# Patient Record
Sex: FEMALE | URBAN - METROPOLITAN AREA
[De-identification: names, ages, dates, MRNs, and addresses within clinical notes are randomized per-mention and may not be internally consistent; named-entity substitution may affect disease eponyms.]

---

## 2023-08-19 ENCOUNTER — TELEPHONE (OUTPATIENT)
Dept: OTHER | Facility: OTHER | Age: 88
End: 2023-08-19

## 2023-08-21 ENCOUNTER — NURSING HOME VISIT (OUTPATIENT)
Dept: GERIATRICS | Facility: OTHER | Age: 88
End: 2023-08-21
Payer: MEDICARE

## 2023-08-21 DIAGNOSIS — Z51.5 HOSPICE CARE PATIENT: ICD-10-CM

## 2023-08-21 DIAGNOSIS — E78.5 HYPERLIPIDEMIA, UNSPECIFIED HYPERLIPIDEMIA TYPE: ICD-10-CM

## 2023-08-21 DIAGNOSIS — F01.50 MIXED ALZHEIMER'S AND VASCULAR DEMENTIA (HCC): ICD-10-CM

## 2023-08-21 DIAGNOSIS — Z86.73 HISTORY OF CVA (CEREBROVASCULAR ACCIDENT): ICD-10-CM

## 2023-08-21 DIAGNOSIS — M17.0 OSTEOARTHRITIS OF BOTH KNEES, UNSPECIFIED OSTEOARTHRITIS TYPE: ICD-10-CM

## 2023-08-21 DIAGNOSIS — E11.59 HYPERTENSION ASSOCIATED WITH DIABETES (HCC): ICD-10-CM

## 2023-08-21 DIAGNOSIS — Z66 DNR (DO NOT RESUSCITATE): ICD-10-CM

## 2023-08-21 DIAGNOSIS — I15.2 HYPERTENSION ASSOCIATED WITH DIABETES (HCC): ICD-10-CM

## 2023-08-21 DIAGNOSIS — I35.0 AORTIC VALVE STENOSIS, ETIOLOGY OF CARDIAC VALVE DISEASE UNSPECIFIED: ICD-10-CM

## 2023-08-21 DIAGNOSIS — I25.10 CORONARY ARTERY DISEASE INVOLVING NATIVE CORONARY ARTERY OF NATIVE HEART WITHOUT ANGINA PECTORIS: ICD-10-CM

## 2023-08-21 DIAGNOSIS — E11.9 TYPE 2 DIABETES MELLITUS WITHOUT COMPLICATION, WITHOUT LONG-TERM CURRENT USE OF INSULIN (HCC): Primary | ICD-10-CM

## 2023-08-21 DIAGNOSIS — I42.9 CARDIOMYOPATHY, UNSPECIFIED TYPE (HCC): ICD-10-CM

## 2023-08-21 DIAGNOSIS — G30.9 MIXED ALZHEIMER'S AND VASCULAR DEMENTIA (HCC): ICD-10-CM

## 2023-08-21 DIAGNOSIS — R54 FRAILTY SYNDROME IN GERIATRIC PATIENT: ICD-10-CM

## 2023-08-21 DIAGNOSIS — F02.80 MIXED ALZHEIMER'S AND VASCULAR DEMENTIA (HCC): ICD-10-CM

## 2023-08-21 DIAGNOSIS — Z86.711 HISTORY OF PULMONARY EMBOLUS (PE): ICD-10-CM

## 2023-08-21 PROCEDURE — 99305 1ST NF CARE MODERATE MDM 35: CPT | Performed by: INTERNAL MEDICINE

## 2023-08-21 NOTE — PROGRESS NOTES
1505 [facility]  [address] [place] [address]  Facility: 10 [facility] and 330 Jayme Chary.  32  Hospice care patient    HISTORY AND PHYSICAL    NAME: Ha Hurd  AGE: 80 y.o. SEX: female    DATE OF ENCOUNTER: 8/21/2023    Code status:  DNR/Hospice care    Assessment and Plan     1. Type 2 diabetes mellitus without complication, without long-term current use of insulin ([facility])  Assessment & Plan:  · We will continue her prior to admission glipizide twice daily  · We will continue with monitoring and adjust her medications, as needed  · We will continue monitoring for change in her condition      2. Frailty syndrome in geriatric patient  Assessment & Plan:  · Secondary to her chronic medical conditions  · She requires 24/7 care/support of her ADLs  · I recommend 24/7 care/support of her ADLs at the nursing facility  · We will continue to monitor for change in her condition      3. Mixed Alzheimer's and vascular dementia [facility])  Assessment & Plan:  · Likely, has history of multiple CVAs with continued steady progression as per history obtained from her son  · She has a history of wandering at her prior facility  · We will provide a safe, secure, structure, and supportive environment at the nursing facility  · We will continue with 24/7 care/support of her ADLs  · We will continue with monitoring for change in her condition      4. Osteoarthritis of both knees, unspecified osteoarthritis type  Assessment & Plan:  · August 2023: Her son reports history of her receiving IACs from [facility] orthopedist Dr. Zoe Suh  · As discussed with her son, will continue with multimodal pain management including routine and as needed Tylenol, topical treatment, and as needed IACs with her orthopedic service  · We will continue with monitoring for change in her condition      5.  Aortic valve stenosis, etiology of cardiac valve disease unspecified  Assessment & Plan:  · Per cardiology note of 2022: Known moderate severity on imaging, but likely progressed to severe  · We will continue her prior to admission furosemide  · We will continue her care in collaboration with her hospice care service  · We will continue with monitoring for change in her condition      6. Cardiomyopathy, unspecified type [facility])  Assessment & Plan:  · We will continue her prior to admission carvedilol, furosemide, and olmesartan  · We will request permission to perform a BMP from her hospice care service to assist in her care  · We will continue her care in collaboration with her hospice care service  · We will continue with monitoring for change in her condition      7. History of CVA (cerebrovascular accident)  Assessment & Plan:  · In 2020 per cardiology H&P note of September 22, 2022  · August 2023: History of multiple CVAs per her son,   · We will continue with prior to admission clopidogrel and atorvastatin for secondary prevention  · We will continue with monitoring for change in her condition      8. Hypertension associated with diabetes ([address])  Assessment & Plan:  · We will continue her prior to admission amlodipine, carvedilol, and olmesartan  · We will continue with monitoring for change in her condition      9. History of pulmonary embolus (PE)  Assessment & Plan:  · In 2020 per cardiology H&P note of September 22, 2022  · We will continue prior to admission apixaban  · We will continue with monitoring for change in her condition      10. Hyperlipidemia, unspecified hyperlipidemia type  Assessment & Plan:  · We will continue prior to admission atorvastatin  · We will continue with monitoring for change in her condition      11. Coronary artery disease involving native coronary artery of native heart without angina pectoris  Assessment & Plan:  · We will continue her prior to admission clopidogrel and atorvastatin  · We will continue with monitoring for change in her condition      12.  Hospice care patient  Assessment & Plan:  · Hospice qualifying admission diagnosis: Cardiomyopathy  · We will continue her care in collaboration with her hospice care service      13. DNR (do not resuscitate)      All medications and routine orders were reviewed and updated as needed. Plan discussed with: Nursing staff and her son, Melvin Ramos. Chief Complaint     She is seen for a visit to perform a history and physical exam to be admitted to the nursing facility. History of Present Illness     History is obtained from patient interview, review of outside electronic medical record, and phone conversation with her son, Melvin Ramos. She is a pleasant 59-year-old woman with the comorbidities of moderate to severe aortic stenosis with cardiomyopathy, a history of multiple CVAs (per son), hypertension, the history of pulmonary embolus, type 2 diabetes mellitus, osteoarthritis of her knees, mixed Alzheimer's and vascular dementia, hyperlipidemia, and coronary artery disease who is seen with female nursing staff for a visit to perform a history and physical exam to be admitted to the nursing facility. She was transferred from her assisted living facility to the nursing facility on August 19, 2023. Her son notes that she was transferred to this nursing facility secondary to her being unhappy with her prior assisted living facility. His mother was transferred to a locked stewart after wandering outside the unlocked stewart and she did not like it on that stewart. She is able to tell me that she can no longer go home because she is not safe by herself. She tells me she was transferred from the [facility] facility to [address] [place] [address]. She knows the year is 2023, but does not know the month or the season. Her son notes that she does not have good short-term memory as she forgets things quickly. He notes that her dementia has been progressing. About a month and a half ago, he states that his mother told him that she had a baby.   He notes that she has received IACs for her knees in the past from her orthopedic specialist, Dr. Charyl Dancer. Her son reports being told that aortic stenosis had progressed and that she was not a candidate for intervention. Therefore, she was transitioned to hospice care. HISTORY:  No past medical history on file. No past surgical history on file. No family history on file. Social History     Socioeconomic History   • Marital status: Not on file     Spouse name: Not on file   • Number of children: Not on file   • Years of education: Not on file   • Highest education level: Not on file   Occupational History   • Not on file   Tobacco Use   • Smoking status: Not on file   • Smokeless tobacco: Not on file   Substance and Sexual Activity   • Alcohol use: Not on file   • Drug use: Not on file   • Sexual activity: Not on file   Other Topics Concern   • Not on file   Social History Narrative   • Not on file     Social Determinants of Health     Financial Resource Strain: Not on file   Food Insecurity: Not on file   Transportation Needs: Not on file   Physical Activity: Not on file   Stress: Not on file   Social Connections: Not on file   Intimate Partner Violence: Not on file   Housing Stability: Not on file       Allergies:  Not on File    Review of Systems     Review of Systems   Respiratory: Negative for shortness of breath. Cardiovascular: Negative for chest pain. Gastrointestinal: Positive for diarrhea (Occasional). Genitourinary: Negative for difficulty urinating. Musculoskeletal:        Bilateral knee pain from arthritis   Neurological: Negative for headaches. Medications and orders     All medications reviewed and updated in [facility] EMR. Objective     Vitals: Weight 161 pounds, temperature 97.6 °F, pulse 70, blood pressure 148/68, fasting fingerstick blood sugar 202. Physical Exam  Vitals reviewed. Exam conducted with a chaperone present. Constitutional:       General: She is awake. She is not in acute distress. Appearance: She is well-developed. She is not toxic-appearing or diaphoretic. Comments: She appears comfortable in her wheelchair, younger than her stated age, and frail. Eyes:      General: No scleral icterus. Conjunctiva/sclera: Conjunctivae normal.   Cardiovascular:      Rate and Rhythm: Normal rate and regular rhythm. Heart sounds: Normal heart sounds. No murmur (2/6 systolic ejection murmur) heard. No friction rub. No gallop. Comments: There is no edema in her legs. Pulmonary:      Effort: Pulmonary effort is normal. No respiratory distress. Breath sounds: Normal breath sounds. No stridor. No wheezing, rhonchi or rales. Neurological:      Mental Status: She is alert. Comments: She is oriented to person, location, and year   Psychiatric:         Mood and Affect: Mood normal.         Behavior: Behavior normal. Behavior is cooperative. - Her order summary was reviewed and signed. Portions of the record may have been created with voice recognition software. Occasional wrong word or "sound a like" substitutions may have occurred due to the inherent limitations of voice recognition software. Read the chart carefully and recognize, using context, where substitutions have occurred.     Domenic Valle M.D.  8/22/2023 11:49 AM

## 2023-08-22 PROBLEM — I35.0 AORTIC STENOSIS: Status: ACTIVE | Noted: 2023-08-22

## 2023-08-22 PROBLEM — R54 FRAILTY SYNDROME IN GERIATRIC PATIENT: Status: ACTIVE | Noted: 2023-08-22

## 2023-08-22 PROBLEM — I42.9 CARDIOMYOPATHY (HCC): Status: ACTIVE | Noted: 2023-08-22

## 2023-08-22 PROBLEM — M17.0 OSTEOARTHRITIS OF BOTH KNEES: Status: ACTIVE | Noted: 2023-08-22

## 2023-08-22 PROBLEM — F02.80 MIXED ALZHEIMER'S AND VASCULAR DEMENTIA (HCC): Status: ACTIVE | Noted: 2023-08-22

## 2023-08-22 PROBLEM — E11.9 TYPE 2 DIABETES MELLITUS (HCC): Status: ACTIVE | Noted: 2023-08-22

## 2023-08-22 PROBLEM — F01.50 MIXED ALZHEIMER'S AND VASCULAR DEMENTIA (HCC): Status: ACTIVE | Noted: 2023-08-22

## 2023-08-22 PROBLEM — E11.59 HYPERTENSION ASSOCIATED WITH DIABETES (HCC): Status: ACTIVE | Noted: 2023-08-22

## 2023-08-22 PROBLEM — Z51.5 HOSPICE CARE PATIENT: Status: ACTIVE | Noted: 2023-08-22

## 2023-08-22 PROBLEM — E78.5 HYPERLIPIDEMIA: Status: ACTIVE | Noted: 2023-08-22

## 2023-08-22 PROBLEM — G30.9 MIXED ALZHEIMER'S AND VASCULAR DEMENTIA (HCC): Status: ACTIVE | Noted: 2023-08-22

## 2023-08-22 PROBLEM — I25.10 CORONARY ARTERY DISEASE INVOLVING NATIVE CORONARY ARTERY OF NATIVE HEART WITHOUT ANGINA PECTORIS: Status: ACTIVE | Noted: 2023-08-22

## 2023-08-22 PROBLEM — Z86.711 HISTORY OF PULMONARY EMBOLUS (PE): Status: ACTIVE | Noted: 2023-08-22

## 2023-08-22 PROBLEM — Z66 DNR (DO NOT RESUSCITATE): Status: ACTIVE | Noted: 2023-08-22

## 2023-08-22 PROBLEM — I15.2 HYPERTENSION ASSOCIATED WITH DIABETES (HCC): Status: ACTIVE | Noted: 2023-08-22

## 2023-08-22 PROBLEM — Z86.73 HISTORY OF CVA (CEREBROVASCULAR ACCIDENT): Status: ACTIVE | Noted: 2023-08-22

## 2023-08-22 RX ORDER — TRAMADOL HYDROCHLORIDE 50 MG/1
50 TABLET ORAL EVERY 8 HOURS PRN
COMMUNITY
End: 2023-08-22

## 2023-08-22 RX ORDER — ATORVASTATIN CALCIUM 10 MG/1
10 TABLET, FILM COATED ORAL DAILY
COMMUNITY

## 2023-08-22 RX ORDER — LORAZEPAM 0.5 MG/1
0.5 TABLET ORAL EVERY 6 HOURS PRN
COMMUNITY

## 2023-08-22 RX ORDER — FUROSEMIDE 40 MG/1
40 TABLET ORAL DAILY
COMMUNITY

## 2023-08-22 RX ORDER — OLMESARTAN MEDOXOMIL 20 MG/1
20 TABLET ORAL DAILY
COMMUNITY

## 2023-08-22 RX ORDER — CLOPIDOGREL BISULFATE 75 MG/1
75 TABLET ORAL DAILY
COMMUNITY

## 2023-08-22 RX ORDER — AMLODIPINE BESYLATE 5 MG/1
5 TABLET ORAL DAILY
COMMUNITY

## 2023-08-22 RX ORDER — GLIPIZIDE 5 MG/1
5 TABLET ORAL
COMMUNITY

## 2023-08-22 RX ORDER — ONDANSETRON 8 MG/1
8 TABLET, ORALLY DISINTEGRATING ORAL EVERY 8 HOURS PRN
COMMUNITY

## 2023-08-22 RX ORDER — MORPHINE SULFATE 20 MG/ML
5 SOLUTION ORAL
COMMUNITY

## 2023-08-22 RX ORDER — CARVEDILOL 6.25 MG/1
6.25 TABLET ORAL EVERY 12 HOURS SCHEDULED
COMMUNITY

## 2023-08-22 RX ORDER — ACETAMINOPHEN 325 MG/1
650 TABLET ORAL EVERY 12 HOURS SCHEDULED
COMMUNITY

## 2023-08-22 NOTE — ASSESSMENT & PLAN NOTE
· Hospice qualifying admission diagnosis: Cardiomyopathy  · We will continue her care in collaboration with her hospice care service

## 2023-08-22 NOTE — ASSESSMENT & PLAN NOTE
· Likely, has history of multiple CVAs with continued steady progression as per history obtained from her son  · She has a history of wandering at her prior facility  · We will provide a safe, secure, structure, and supportive environment at the nursing facility  · We will continue with 24/7 care/support of her ADLs  · We will continue with monitoring for change in her condition

## 2023-08-22 NOTE — ASSESSMENT & PLAN NOTE
· In 2020 per cardiology H&P note of September 22, 2022  · August 2023: History of multiple CVAs per her son,   · We will continue with prior to admission clopidogrel and atorvastatin for secondary prevention  · We will continue with monitoring for change in her condition

## 2023-08-22 NOTE — ASSESSMENT & PLAN NOTE
· Secondary to her chronic medical conditions  · She requires 24/7 care/support of her ADLs  · I recommend 24/7 care/support of her ADLs at the nursing facility  · We will continue to monitor for change in her condition

## 2023-08-22 NOTE — ASSESSMENT & PLAN NOTE
· We will continue her prior to admission carvedilol, furosemide, and olmesartan  · We will request permission to perform a BMP from her hospice care service to assist in her care  · We will continue her care in collaboration with her hospice care service  · We will continue with monitoring for change in her condition

## 2023-08-22 NOTE — ASSESSMENT & PLAN NOTE
· August 2023: Her son reports history of her receiving IACs from [facility] orthopedist Dr. Zoe Suh  · As discussed with her son, will continue with multimodal pain management including routine and as needed Tylenol, topical treatment, and as needed IACs with her orthopedic service  · We will continue with monitoring for change in her condition

## 2023-08-22 NOTE — ASSESSMENT & PLAN NOTE
· We will continue her prior to admission clopidogrel and atorvastatin  · We will continue with monitoring for change in her condition

## 2023-08-22 NOTE — ASSESSMENT & PLAN NOTE
· In 2020 per cardiology H&P note of September 22, 2022  · We will continue prior to admission apixaban  · We will continue with monitoring for change in her condition

## 2023-08-22 NOTE — ASSESSMENT & PLAN NOTE
· We will continue prior to admission atorvastatin  · We will continue with monitoring for change in her condition

## 2023-08-22 NOTE — ASSESSMENT & PLAN NOTE
· We will continue her prior to admission glipizide twice daily  · We will continue with monitoring and adjust her medications, as needed  · We will continue monitoring for change in her condition

## 2023-08-22 NOTE — ASSESSMENT & PLAN NOTE
· Per cardiology note of 2022: Known moderate severity on imaging, but likely progressed to severe  · We will continue her prior to admission furosemide  · We will continue her care in collaboration with her hospice care service  · We will continue with monitoring for change in her condition

## 2023-08-22 NOTE — ASSESSMENT & PLAN NOTE
· We will continue her prior to admission amlodipine, carvedilol, and olmesartan  · We will continue with monitoring for change in her condition

## 2023-09-19 ENCOUNTER — NURSING HOME VISIT (OUTPATIENT)
Dept: GERIATRICS | Facility: OTHER | Age: 88
End: 2023-09-19
Payer: MEDICARE

## 2023-09-19 DIAGNOSIS — I15.2 HYPERTENSION ASSOCIATED WITH DIABETES: Primary | ICD-10-CM

## 2023-09-19 DIAGNOSIS — Z86.73 HISTORY OF CVA (CEREBROVASCULAR ACCIDENT): ICD-10-CM

## 2023-09-19 DIAGNOSIS — E11.59 HYPERTENSION ASSOCIATED WITH DIABETES: Primary | ICD-10-CM

## 2023-09-19 DIAGNOSIS — F01.50 MIXED ALZHEIMER'S AND VASCULAR DEMENTIA (HCC): ICD-10-CM

## 2023-09-19 DIAGNOSIS — I42.9 CARDIOMYOPATHY, UNSPECIFIED TYPE (HCC): ICD-10-CM

## 2023-09-19 DIAGNOSIS — F02.80 MIXED ALZHEIMER'S AND VASCULAR DEMENTIA (HCC): ICD-10-CM

## 2023-09-19 DIAGNOSIS — Z86.711 HISTORY OF PULMONARY EMBOLUS (PE): ICD-10-CM

## 2023-09-19 DIAGNOSIS — G30.9 MIXED ALZHEIMER'S AND VASCULAR DEMENTIA (HCC): ICD-10-CM

## 2023-09-19 DIAGNOSIS — Z51.5 HOSPICE CARE PATIENT: ICD-10-CM

## 2023-09-19 PROCEDURE — 99309 SBSQ NF CARE MODERATE MDM 30: CPT | Performed by: PHYSICIAN ASSISTANT

## 2023-09-20 VITALS
HEART RATE: 70 BPM | TEMPERATURE: 97.6 F | WEIGHT: 161.2 LBS | SYSTOLIC BLOOD PRESSURE: 140 MMHG | DIASTOLIC BLOOD PRESSURE: 66 MMHG

## 2023-09-20 NOTE — PROGRESS NOTES
2050 [facility]  [phone]  [facility]  Code 32  Hospice care      NAME: Henrry Cueva  AGE: 80 y.o. SEX: female [phone]    DATE OF ENCOUNTER: 9/21/23    CODE STATUS: DNR/hospice    Assessment and Plan     Problem List Items Addressed This Visit        Endocrine    Hypertension associated with diabetes ([address]) - Primary       Continue amlodipine, carvedilol, olmesartan            Cardiovascular and Mediastinum    Cardiomyopathy ([address])     Continue carvedilol, olmesartan         Mixed Alzheimer's and vascular dementia ([address])     Admitted to long term care facility one month ago  Has adjusted well  Continue supportive care            Other    History of pulmonary embolus (PE)     Previously on eliquis, discontinued per hospice recommendations  Continue ASA         History of CVA (cerebrovascular accident)     Continue plavix/statin         Hospice care patient       No orders of the defined types were placed in this encounter. Chief Complaint     Chief Complaint   Patient presents with   • Geriatric Evaluation     Follow up       History of Present Illness   80year old female being seen today in collaboration with nursing for follow up for chronic conditions. She was admitted to long term care facility on hospice 1 month ago. She has no specific complaints. Nursing has no concerns       The following portions of the patient's history were reviewed and updated as appropriate: allergies, current medications, past family history, past medical history, past social history, past surgical history and problem list.    Review of Systems   Review of Systems   Constitutional: Positive for appetite change. HENT: Negative. Eyes: Negative. Respiratory: Negative. Gastrointestinal: Negative. Endocrine: Negative. Genitourinary: Negative. Musculoskeletal: Positive for arthralgias. Allergic/Immunologic: Negative. Neurological: Negative. Hematological: Negative. Psychiatric/Behavioral: Negative. Active Problem List     Patient Active Problem List   Diagnosis   • History of pulmonary embolus (PE)   • Aortic stenosis   • Cardiomyopathy ([address])   • History of CVA (cerebrovascular accident)   • Hypertension associated with diabetes ([address])   • Frailty syndrome in geriatric patient   • Hospice care patient   • DNR (do not resuscitate)   • Type 2 diabetes mellitus ([address])   • Hyperlipidemia   • Coronary artery disease involving native coronary artery of native heart without angina pectoris   • Osteoarthritis of both knees   • Mixed Alzheimer's and vascular dementia ([facility])         Objective     /66   Pulse 70   Temp 97.6 °F (36.4 °C)   Wt 73.1 kg (161 lb 3.2 oz)     Physical Exam  Vitals reviewed. Constitutional:       General: She is not in acute distress. Appearance: She is not diaphoretic. HENT:      Head: Normocephalic and atraumatic. Nose: Nose normal.      Mouth/Throat:      Pharynx: Oropharynx is clear. Eyes:      Conjunctiva/sclera: Conjunctivae normal.   Cardiovascular:      Rate and Rhythm: Normal rate. Pulmonary:      Effort: Pulmonary effort is normal. No respiratory distress. Abdominal:      General: There is no distension. Palpations: Abdomen is soft. Tenderness: There is no abdominal tenderness. Musculoskeletal:         General: No deformity or signs of injury. Skin:     General: Skin is warm and dry. Findings: No bruising or erythema. Neurological:      Mental Status: She is alert. Mental status is at baseline. Pertinent Laboratory/Diagnostic Studies:    none    Current Medications   Medications reviewed and updated in facility chart.

## 2023-11-16 ENCOUNTER — NURSING HOME VISIT (OUTPATIENT)
Dept: GERIATRICS | Facility: OTHER | Age: 88
End: 2023-11-16
Payer: MEDICARE

## 2023-11-16 DIAGNOSIS — E11.9 TYPE 2 DIABETES MELLITUS WITHOUT COMPLICATION, WITHOUT LONG-TERM CURRENT USE OF INSULIN (HCC): ICD-10-CM

## 2023-11-16 DIAGNOSIS — F02.80 MIXED ALZHEIMER'S AND VASCULAR DEMENTIA (HCC): ICD-10-CM

## 2023-11-16 DIAGNOSIS — R54 FRAILTY SYNDROME IN GERIATRIC PATIENT: Primary | ICD-10-CM

## 2023-11-16 DIAGNOSIS — F01.50 MIXED ALZHEIMER'S AND VASCULAR DEMENTIA (HCC): ICD-10-CM

## 2023-11-16 DIAGNOSIS — E11.59 HYPERTENSION ASSOCIATED WITH DIABETES: ICD-10-CM

## 2023-11-16 DIAGNOSIS — I15.2 HYPERTENSION ASSOCIATED WITH DIABETES: ICD-10-CM

## 2023-11-16 DIAGNOSIS — Z51.5 HOSPICE CARE PATIENT: ICD-10-CM

## 2023-11-16 DIAGNOSIS — G30.9 MIXED ALZHEIMER'S AND VASCULAR DEMENTIA (HCC): ICD-10-CM

## 2023-11-16 PROCEDURE — 99309 SBSQ NF CARE MODERATE MDM 30: CPT | Performed by: INTERNAL MEDICINE

## 2023-11-30 RX ORDER — FUROSEMIDE 40 MG/1
40 TABLET ORAL DAILY
COMMUNITY
Start: 2023-08-19

## 2023-11-30 NOTE — ASSESSMENT & PLAN NOTE
Nursing reports that she is doing well and not exhibiting any behaviors that are distressing to her  Will continue with 24/7 care/support of her ADLs at the nursing facility  Will continue to provide a safe, secure, structure, and supportive environment at the nursing facility  Will continue to monitor for change in her condition

## 2023-11-30 NOTE — ASSESSMENT & PLAN NOTE
Hospice qualifying admission diagnosis: Cardiomyopathy  Will continue her care in collaboration with the hospice care service  At this time, she is doing well with comfort focused care  Will continue with monitoring for change in her condition

## 2023-11-30 NOTE — PROGRESS NOTES
[id number] [place] [address], [address] [place] [address]  Facility: 10 [facility] and [address] Jayme Abdiele.  32  Follow-up visit  Hospice care patient    NAME: Jennie Ren  AGE: 80 y.o. SEX: female    DATE OF ENCOUNTER: November 16, 2023. Code status:   DNR    Assessment and Plan     1. 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 Hypertension associated with diabetes   Assessment & Plan:  She is doing well with amlodipine and olmesartan  We will continue with monitoring for change in her condition      3. Type 2 diabetes mellitus without complication, without long-term current use of insulin ([facility])  Assessment & Plan:  Review of her fingerstick blood sugar log shows borderline fasting blood sugars  Will continue with monitoring for change in her condition  Further recommendations, pending results      4. Mixed Alzheimer's and vascular dementia [facility])  Assessment & Plan:  Nursing reports that she is doing well and not exhibiting any behaviors that are distressing to her  Will continue with 24/7 care/support of her ADLs at the nursing facility  Will continue to provide a safe, secure, structure, and supportive environment at the nursing facility  Will continue to monitor for change in her condition      5. Hospice care patient  Assessment & Plan:  Hospice qualifying admission diagnosis: Cardiomyopathy  Will continue her care in collaboration with the hospice care service  At this time, she is doing well with comfort focused care  Will continue with monitoring for change in her condition        See my note of August 21, 2023 for further information.     All medications and routine orders were reviewed and updated as needed. Plan discussed with: Nursing staff. Chief Complaint     She is seen for a follow-up visit to update the care and treatment of her chronic medical conditions. History of Present Illness     She is a 12-year-old woman who is seen with nursing staff for a follow-up visit to update the care and treatment of her hypertension, type 2 diabetes mellitus, frailty secondary to her chronic medical conditions, mixed Alzheimer's and vascular dementia. She continues under the hospice care service for the diagnosis of cardiomyopathy. Nursing reports that her appetite is stable, that she is sleeping well, and that she is having no difficulty with constipation. Nursing reports that she is not exhibiting any behaviors that are distressing to her or disruptive to the nursing unit. She denies having pain. The following portions of the patient's history were reviewed and updated as appropriate: current medications, past family history, past medical history, past social history, past surgical history and problem list.    Allergies:  Not on File    Review of Systems     Review of Systems   Unable to perform ROS: Dementia       Medications and orders     All medications reviewed and updated in [facility] EMR. Objective     Vitals: Multi vitals: Weight 150 pounds (decreased 11 pounds in the last 3 months), pulse 68, blood pressure 122/57, fasting fingerstick blood sugar 208. Physical Exam  Vitals reviewed. Exam conducted with a chaperone present. Constitutional:       General: She is awake. She is not in acute distress. Neurological:      Mental Status: She is alert. Psychiatric:         Behavior: Behavior is cooperative. Pertinent Laboratory/Diagnostic Studies: The following labs were reviewed please see chart or hospital paperwork for details.     November 1, 2023:    CMP: Sodium 138, potassium 4.3, BUN 26, creatinine 0.7, fasting blood sugar 174, EGFR 81, LFTs within normal limits except alkaline phosphatase 134    CBC with differential: WBC count 7.2, hemoglobin 11.5, hematocrit 34.3, platelet count 829,354, MCV 88    - Order summary reviewed and signed. Portions of the record may have been created with voice recognition software. Occasional wrong word or "sound a like" substitutions may have occurred due to the inherent limitations of voice recognition software. Read the chart carefully and recognize, using context, where substitutions have occurred.     Chavo Espinoza M.D.  11/30/2023 9:38 AM

## 2023-11-30 NOTE — ASSESSMENT & PLAN NOTE
She is doing well with amlodipine and olmesartan  We will continue with monitoring for change in her condition

## 2023-11-30 NOTE — ASSESSMENT & PLAN NOTE
Secondary to her chronic medical conditions  She continues to require 24/7 care/support of her ADLs  I recommend continued care/support of her ADLs as a long-term resident at the nursing facility  We will continue to monitor for change in her condition

## 2023-11-30 NOTE — ASSESSMENT & PLAN NOTE
Review of her fingerstick blood sugar log shows borderline fasting blood sugars  Will continue with monitoring for change in her condition  Further recommendations, pending results

## 2023-12-26 ENCOUNTER — NURSING HOME VISIT (OUTPATIENT)
Dept: GERIATRICS | Facility: OTHER | Age: 88
End: 2023-12-26
Payer: MEDICARE

## 2023-12-26 DIAGNOSIS — G30.9 MIXED ALZHEIMER'S AND VASCULAR DEMENTIA (HCC): ICD-10-CM

## 2023-12-26 DIAGNOSIS — F02.80 MIXED ALZHEIMER'S AND VASCULAR DEMENTIA (HCC): ICD-10-CM

## 2023-12-26 DIAGNOSIS — E11.59 HYPERTENSION ASSOCIATED WITH DIABETES: Primary | ICD-10-CM

## 2023-12-26 DIAGNOSIS — Z86.73 HISTORY OF CVA (CEREBROVASCULAR ACCIDENT): ICD-10-CM

## 2023-12-26 DIAGNOSIS — I15.2 HYPERTENSION ASSOCIATED WITH DIABETES: Primary | ICD-10-CM

## 2023-12-26 DIAGNOSIS — Z66 DNR (DO NOT RESUSCITATE): ICD-10-CM

## 2023-12-26 DIAGNOSIS — R54 FRAILTY SYNDROME IN GERIATRIC PATIENT: ICD-10-CM

## 2023-12-26 DIAGNOSIS — Z51.5 HOSPICE CARE PATIENT: ICD-10-CM

## 2023-12-26 DIAGNOSIS — F01.50 MIXED ALZHEIMER'S AND VASCULAR DEMENTIA (HCC): ICD-10-CM

## 2023-12-26 DIAGNOSIS — I35.0 AORTIC VALVE STENOSIS, ETIOLOGY OF CARDIAC VALVE DISEASE UNSPECIFIED: ICD-10-CM

## 2023-12-26 DIAGNOSIS — E11.9 TYPE 2 DIABETES MELLITUS WITHOUT COMPLICATION, WITHOUT LONG-TERM CURRENT USE OF INSULIN (HCC): ICD-10-CM

## 2023-12-26 PROCEDURE — 99309 SBSQ NF CARE MODERATE MDM 30: CPT | Performed by: INTERNAL MEDICINE

## 2024-01-23 ENCOUNTER — NURSING HOME VISIT (OUTPATIENT)
Dept: GERIATRICS | Facility: OTHER | Age: 89
End: 2024-01-23
Payer: MEDICARE

## 2024-01-23 VITALS
TEMPERATURE: 97.7 F | HEART RATE: 64 BPM | DIASTOLIC BLOOD PRESSURE: 40 MMHG | SYSTOLIC BLOOD PRESSURE: 101 MMHG | WEIGHT: 154.5 LBS

## 2024-01-23 DIAGNOSIS — I35.0 AORTIC VALVE STENOSIS, ETIOLOGY OF CARDIAC VALVE DISEASE UNSPECIFIED: ICD-10-CM

## 2024-01-23 DIAGNOSIS — Z51.5 HOSPICE CARE PATIENT: ICD-10-CM

## 2024-01-23 DIAGNOSIS — E11.59 HYPERTENSION ASSOCIATED WITH DIABETES: Primary | ICD-10-CM

## 2024-01-23 DIAGNOSIS — I15.2 HYPERTENSION ASSOCIATED WITH DIABETES: Primary | ICD-10-CM

## 2024-01-23 DIAGNOSIS — I42.9 CARDIOMYOPATHY, UNSPECIFIED TYPE (HCC): ICD-10-CM

## 2024-01-23 PROCEDURE — 99309 SBSQ NF CARE MODERATE MDM 30: CPT | Performed by: PHYSICIAN ASSISTANT

## 2024-01-23 NOTE — ASSESSMENT & PLAN NOTE
"  Recently has been having lower BP. Today 101/40  Historically on norvasc, coreg, lasix, benicar  On exam, patient with no LE.  Son called unit asking for med list review because patient now on hospice and \"does not want to take so many pills\". Will discontinue coreg and lasix at this point. Will re-eval BP again after a few days to see if additional medications should be discontinued  "

## 2024-01-23 NOTE — PROGRESS NOTES
"[facility]  [address] [address]  [phone]  [facility]  Code 32  Hospice care      NAME: Florida Elliott  AGE: 89 y.o. SEX: female [phone]    DATE OF ENCOUNTER: 1/23/2024    CODE STATUS: DNR    Assessment and Plan     Problem List Items Addressed This Visit          Endocrine    Hypertension associated with diabetes  - Primary       Recently has been having lower BP. Today 101/40  Historically on norvasc, coreg, lasix, benicar  On exam, patient with no LE.  Son called unit asking for med list review because patient now on hospice and \"does not want to take so many pills\". Will discontinue coreg and lasix at this point. Will re-eval BP again after a few days to see if additional medications should be discontinued            Cardiovascular and Mediastinum    Aortic stenosis     Son requested medication list review now that patient on hospice. He specifically asked for discontinuation of plavix. Hospice aware of son's request and plavix discontinued. Will continue ASA for now         Cardiomyopathy (HCC)     Recently opened on hospice with [facility]              Other    Hospice care patient       No orders of the defined types were placed in this encounter.          Chief Complaint     Chief Complaint   Patient presents with    Geriatric Evaluation     Med list review       History of Present Illness   89 year old female resident of [facility] being seen today for medication list review requested by son. Patient was started on hospice 3 months ago. Son requesting to decrease pill burden if possible since she does not like taking all her pills. Hospice aware of son's request. Patient seen in collaboration with nursing today. She is sitting in chair in her room. Has no complaints         The following portions of the patient's history were reviewed and updated as appropriate: allergies, current medications, past family history, past medical " history, past social history, past surgical history and problem list.    Review of Systems   Review of Systems   Unable to perform ROS: Dementia          Active Problem List     Patient Active Problem List   Diagnosis    History of pulmonary embolus (PE)    Aortic stenosis    Cardiomyopathy (HCC)    History of CVA (cerebrovascular accident)    Hypertension associated with diabetes     Frailty syndrome in geriatric patient    Hospice care patient    DNR (do not resuscitate)    Type 2 diabetes mellitus (HCC)    Hyperlipidemia    Coronary artery disease involving native coronary artery of native heart without angina pectoris    Osteoarthritis of both knees    Mixed Alzheimer's and vascular dementia (HCC)         Objective     BP (!) 101/40   Pulse 64   Temp 97.7 °F (36.5 °C)   Wt 70.1 kg (154 lb 8 oz)     Physical Exam  Vitals reviewed.   Constitutional:       General: She is not in acute distress.     Appearance: She is not ill-appearing or diaphoretic.   HENT:      Head: Normocephalic and atraumatic.   Cardiovascular:      Rate and Rhythm: Normal rate.   Pulmonary:      Effort: Pulmonary effort is normal. No respiratory distress.   Musculoskeletal:         General: No deformity or signs of injury.      Right lower leg: No edema.      Left lower leg: No edema.   Skin:     General: Skin is warm and dry.      Findings: No bruising or erythema.   Neurological:      Mental Status: She is alert. Mental status is at baseline.         Pertinent Laboratory/Diagnostic Studies:    none    Current Medications   Medications reviewed and updated in facility chart.

## 2024-01-23 NOTE — ASSESSMENT & PLAN NOTE
Son requested medication list review now that patient on hospice. He specifically asked for discontinuation of plavix. Hospice aware of son's request and plavix discontinued. Will continue ASA for now

## 2024-02-09 NOTE — ASSESSMENT & PLAN NOTE
Hospice qualifying admission diagnosis: Cardiomyopathy  Will continue her care in collaboration with her hospice care service with comfort focused care

## 2024-02-09 NOTE — ASSESSMENT & PLAN NOTE
Secondary to her chronic medical conditions  She continues to require 24/7 care/support of her ADLs  I recommend continued care/support of her ADLs as a long-term resident at the nursing facility  Will continue to monitor for change in her condition

## 2024-02-09 NOTE — ASSESSMENT & PLAN NOTE
Will continue to provide a safe, secure, structured, and supportive environment at the nursing facility  Will continue with 24/7 care/support of her ADLs  Will continue with monitoring for change in her condition

## 2024-02-09 NOTE — ASSESSMENT & PLAN NOTE
She is doing well with second prevention of aspirin and atorvastatin  Will continue with this care plan  Will continue with monitoring for change in her condition

## 2024-02-09 NOTE — ASSESSMENT & PLAN NOTE
She is doing well with carvedilol and olmesartan  Will continue with this care plan  Will continue with monitoring for change in her condition

## 2024-02-09 NOTE — PROGRESS NOTES
[facility]  [address]  [place] [address]  Facility: [facility]  [facility]  32  Follow-up visit  Hospice care patient    NAME: Florida Elliott  AGE: 89 y.o. SEX: female    DATE OF ENCOUNTER: December 26, 2023.    Code status:   DNR/hospice care patient    Assessment and Plan     1. Hypertension associated with diabetes   Assessment & Plan:  She is doing well with carvedilol and olmesartan  Will continue with this care plan  Will continue with monitoring for change in her condition      2. Type 2 diabetes mellitus without complication, without long-term current use of insulin ([facility])  Assessment & Plan:  Review of her fingerstick blood sugar log looks well without medication  Will continue with periodic monitoring for change in her condition      3. Frailty syndrome in geriatric patient  Assessment & Plan:  Secondary to her chronic medical conditions  She continues to require 24/7 care/support of her ADLs  I recommend continued care/support of her ADLs as a long-term resident at the nursing facility  Will continue to monitor for change in her condition      4. History of CVA (cerebrovascular accident)  Assessment & Plan:  She is doing well with second prevention of aspirin and atorvastatin  Will continue with this care plan  Will continue with monitoring for change in her condition      5. Aortic valve stenosis, etiology of cardiac valve disease unspecified  Assessment & Plan:  She is asymptomatic  Will continue with monitoring for change in her condition      6. Mixed Alzheimer's and vascular dementia (HCC)  Assessment & Plan:  Will continue to provide a safe, secure, structured, and supportive environment at the nursing facility  Will continue with 24/7 care/support of her ADLs  Will continue with monitoring for change in her condition      7. Hospice care patient  Assessment & Plan:  Hospice qualifying admission diagnosis: Cardiomyopathy  Will  continue her care in collaboration with her hospice care service with comfort focused care      8. DNR (do not resuscitate)      See my note of November 16, 2023 for further information.    All medications and routine orders were reviewed and updated as needed.    Plan discussed with: Nursing staff.    Chief Complaint     She is seen for a follow-up visit to update the care and treatment of her chronic medical conditions.    History of Present Illness     She is a 89-year-old woman who is seen for a follow-up visit to update the care and treatment of her chronic medical conditions, including hypertension, type 2 diabetes mellitus, frailty secondary to her chronic medical conditions, and history of a CVA.    She denies chest pain and shortness of breath.  She denies having pain.    Nursing staff reports that her overall clinical/functional status is stable, that her appetite is stable, that she is sleeping well, and that she is having no difficulty with her bowel movements.  Nursing reports that she is not exhibiting any behaviors that are distressing to her or disruptive to the nursing unit.    The following portions of the patient's history were reviewed and updated as appropriate: current medications, past family history, past medical history, past social history, past surgical history and problem list.    Allergies:  Not on File    Review of Systems     Review of Systems   Respiratory:  Negative for shortness of breath.    Cardiovascular:  Negative for chest pain.       Medications and orders     All medications reviewed and updated in shelter EMR.      Objective     Vitals: Recent vitals: Weight 155.7 pounds, pulse 65, blood pressure 131/51, fasting fingerstick blood sugar 195.    Physical Exam  Vitals reviewed.   Cardiovascular:      Rate and Rhythm: Normal rate and regular rhythm.      Heart sounds: Murmur (2/6 to 3/6 holosystolic murmur) heard.      No friction rub. No gallop.   Musculoskeletal:       "Right lower leg: No edema.      Left lower leg: No edema.       Pertinent Laboratory/Diagnostic Studies:     The following labs were reviewed please see chart or hospital paperwork for details.    November 10, 2023:    CMP: Sodium 138, potassium 4.3, BUN 26, creatinine 0.7, fasting blood sugar 174, EGFR 81, LFTs within normal limits except for alkaline phosphatase 134    CBC with differential: WBC count 7.2, hemoglobin 11.5, hematocrit 34.3, platelet count 225,000, MCV 88    - Her order summary was reviewed and signed.      Portions of the record may have been created with voice recognition software.  Occasional wrong word or \"sound a like\" substitutions may have occurred due to the inherent limitations of voice recognition software.  Read the chart carefully and recognize, using context, where substitutions have occurred.    Wilner Cooper M.D.  2/9/2024 10:41 AM      "

## 2024-02-09 NOTE — ASSESSMENT & PLAN NOTE
Review of her fingerstick blood sugar log looks well without medication  Will continue with periodic monitoring for change in her condition

## 2024-02-21 ENCOUNTER — NURSING HOME VISIT (OUTPATIENT)
Dept: GERIATRICS | Facility: OTHER | Age: 89
End: 2024-02-21
Payer: MEDICARE

## 2024-02-21 DIAGNOSIS — F02.80 MIXED ALZHEIMER'S AND VASCULAR DEMENTIA (HCC): ICD-10-CM

## 2024-02-21 DIAGNOSIS — I42.9 CARDIOMYOPATHY, UNSPECIFIED TYPE (HCC): ICD-10-CM

## 2024-02-21 DIAGNOSIS — Z86.711 HISTORY OF PULMONARY EMBOLUS (PE): ICD-10-CM

## 2024-02-21 DIAGNOSIS — Z51.5 HOSPICE CARE PATIENT: ICD-10-CM

## 2024-02-21 DIAGNOSIS — F01.50 MIXED ALZHEIMER'S AND VASCULAR DEMENTIA (HCC): ICD-10-CM

## 2024-02-21 DIAGNOSIS — G30.9 MIXED ALZHEIMER'S AND VASCULAR DEMENTIA (HCC): ICD-10-CM

## 2024-02-21 DIAGNOSIS — E11.59 HYPERTENSION ASSOCIATED WITH DIABETES: Primary | ICD-10-CM

## 2024-02-21 DIAGNOSIS — I15.2 HYPERTENSION ASSOCIATED WITH DIABETES: Primary | ICD-10-CM

## 2024-02-21 PROCEDURE — 99309 SBSQ NF CARE MODERATE MDM 30: CPT | Performed by: PHYSICIAN ASSISTANT

## 2024-02-25 VITALS
HEART RATE: 64 BPM | WEIGHT: 145.9 LBS | DIASTOLIC BLOOD PRESSURE: 37 MMHG | TEMPERATURE: 98 F | RESPIRATION RATE: 18 BRPM | SYSTOLIC BLOOD PRESSURE: 91 MMHG

## 2024-02-25 NOTE — PROGRESS NOTES
[facility]  [address] [address]  [phone]  [facility]  Code 32  Hospice care      NAME: Florida Elloitt  AGE: 89 y.o. SEX: female [phone]    DATE OF ENCOUNTER: 2/21/24    CODE STATUS: DNR    Assessment and Plan     Problem List Items Addressed This Visit          Endocrine    Hypertension associated with diabetes  - Primary     Historically has has HTN requiring antihypertensive medications including benicar and norvasc  BP's have recently bee running on the lower side and all her antihypertensive medications have been discontinued  Continue to monitor            Cardiovascular and Mediastinum    Cardiomyopathy (HCC)     Recently opened on [facility] hospice with dx of cardiomyopathy         Mixed Alzheimer's and vascular dementia (HCC)     Continue supportive care            Other    History of pulmonary embolus (PE)     Previously on eliquis, discontinued previously per hospice recommendations         Hospice care patient       No orders of the defined types were placed in this encounter.          Chief Complaint     Chief Complaint   Patient presents with    Geriatric Evaluation     Follow up       History of Present Illness   89 year old female resident of [facility] being seen today in collaboration with nursing for follow up. She recently opened on [facility] hospice. Nursing states she has a poor appetite frequently. She does still get OOB and sit in chair. She denies pain at this time         The following portions of the patient's history were reviewed and updated as appropriate: allergies, current medications, past family history, past medical history, past social history, past surgical history and problem list.    Review of Systems   Review of Systems   Unable to perform ROS: Dementia          Active Problem List     Patient Active Problem List   Diagnosis    History of pulmonary embolus (PE)    Aortic stenosis    Cardiomyopathy  (HCC)    History of CVA (cerebrovascular accident)    Hypertension associated with diabetes     Frailty syndrome in geriatric patient    Hospice care patient    DNR (do not resuscitate)    Type 2 diabetes mellitus (HCC)    Hyperlipidemia    Coronary artery disease involving native coronary artery of native heart without angina pectoris    Osteoarthritis of both knees    Mixed Alzheimer's and vascular dementia (HCC)         Objective     BP (!) 91/37   Pulse 64   Temp 98 °F (36.7 °C)   Resp 18   Wt 66.2 kg (145 lb 14.4 oz)     Physical Exam  Vitals reviewed.   Constitutional:       General: She is not in acute distress.     Appearance: She is not diaphoretic.   HENT:      Head: Normocephalic and atraumatic.      Nose: Nose normal.      Mouth/Throat:      Pharynx: Oropharynx is clear.   Cardiovascular:      Rate and Rhythm: Normal rate.   Pulmonary:      Effort: Pulmonary effort is normal. No respiratory distress.      Breath sounds: Normal breath sounds.   Abdominal:      General: Bowel sounds are normal. There is no distension.      Palpations: Abdomen is soft.   Musculoskeletal:         General: No deformity or signs of injury.      Right lower leg: No edema.      Left lower leg: No edema.   Skin:     General: Skin is warm and dry.      Findings: No bruising or erythema.   Neurological:      Mental Status: She is alert. Mental status is at baseline.         Pertinent Laboratory/Diagnostic Studies:        Current Medications   Medications reviewed and updated in facility chart.

## 2024-02-25 NOTE — ASSESSMENT & PLAN NOTE
Historically has has HTN requiring antihypertensive medications including benicar and norvasc  BP's have recently bee running on the lower side and all her antihypertensive medications have been discontinued  Continue to monitor